# Patient Record
Sex: MALE | Race: WHITE | NOT HISPANIC OR LATINO | URBAN - METROPOLITAN AREA
[De-identification: names, ages, dates, MRNs, and addresses within clinical notes are randomized per-mention and may not be internally consistent; named-entity substitution may affect disease eponyms.]

---
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
## 2022-04-25 NOTE — ED PEDIATRIC TRIAGE NOTE - NS ED TRIAGE AVPU SCALE
to get stronger
Get better / go home
Alert-The patient is alert, awake and responds to voice. The patient is oriented to time, place, and person. The triage nurse is able to obtain subjective information.

## 2024-06-27 NOTE — ED PEDIATRIC NURSE NOTE - NS TRANSFER PATIENT BELONGINGS
New order for supplies sent to [facility].  Pt notified through portal    Carly MCCALL, RRT / [facility]     Clothing

## 2025-08-04 NOTE — ED PROVIDER NOTE - MUSCULOSKELETAL NECK EXAM
Detail Level: Zone Plan: Sunscreen recommended and encouraged when exposed to ambient sunlight to prevent photoaging and [id number] cancer progression no midline tenderness